# Patient Record
Sex: MALE | Race: WHITE
[De-identification: names, ages, dates, MRNs, and addresses within clinical notes are randomized per-mention and may not be internally consistent; named-entity substitution may affect disease eponyms.]

---

## 2019-04-07 ENCOUNTER — HOSPITAL ENCOUNTER (EMERGENCY)
Dept: HOSPITAL 56 - MW.ED | Age: 29
Discharge: HOME | End: 2019-04-07
Payer: COMMERCIAL

## 2019-04-07 DIAGNOSIS — S01.81XA: Primary | ICD-10-CM
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
## 2019-04-07 PROCEDURE — 12013 RPR F/E/E/N/L/M 2.6-5.0 CM: CPT

## 2019-04-07 PROCEDURE — 99283 EMERGENCY DEPT VISIT LOW MDM: CPT

## 2019-04-07 NOTE — EDM.PDOC
ED HPI GENERAL MEDICAL PROBLEM





- General


Chief Complaint: Laceration


Stated Complaint: HEAD INJURY


Time Seen by Provider: 04/07/19 16:10


Source of Information: Reports: Patient


History Limitations: Reports: No Limitations





- History of Present Illness


INITIAL COMMENTS - FREE TEXT/NARRATIVE: 


HISTORY AND PHYSICAL:





History of present illness:


Patient is a 28-year-old male who presents to the ED today with concern of a 

forehead laceration that occurred just prior to arrival to the ED. Patient was 

swimming when another child had done a back flip and hit him in the forehead 

with his goggles. Patient did not lose consciousness. Patient states he is up-to

-date with his tetanus vaccine.





Patient denies fever, chills. Denies headache, neck stiff ness, change in vision

, syncope, or near syncope. Denies nausea, vomiting. Patient denies any health 

history.


Review of systems: 


As per history of present illness and below otherwise all systems reviewed and 

negative.





Past medical history: 


As per history of present illness and as reviewed below otherwise 

noncontributory.





Surgical history: 


As per history of present illness and as reviewed below otherwise 

noncontributory.





Social history: 


See social history for further information





Family history: 


As per history of present illness and as reviewed below otherwise 

noncontributory.





Physical exam:


General: Patient is alert, oriented, and in no acute distress. He is sitting 

comfortably on exam table.


HEENT: Atraumatic, normocephalic, pupils equal and reactive bilaterally, 

negative for conjunctival pallor or scleral icterus, mucous membranes moist, 

TMs normal bilaterally, throat clear, neck supple, nontender, trachea midline. 

No drooling or trismus noted. No meningeal signs. No hot potato voice noted. 

There is a 3 cm superficial triangular laceration of the left forehead with 

minimal bleeding. 


Lungs: Clear to auscultation, breath sounds equal bilaterally, chest nontender.


Heart: S1S2, regular rate and rhythm without overt murmur


Abdomen: Soft, nondistended, nontender. Negative for masses or 

hepatosplenomegaly. Negative for costovertebral tenderness.


Pelvis: Stable nontender.


Genitourinary: Deferred.


Rectal: Deferred.


Skin: Intact, warm, dry. No lesions or rashes noted.


Extremities: Atraumatic, negative for cords or calf pain. Neurovascular 

unremarkable.


Neuro: Awake, alert, oriented. Cranial nerves II through XII unremarkable. 

Cerebellum unremarkable. Motor and sensory unremarkable throughout. Exam 

nonfocal.





Notes:


Supportive care measures were reviewed and discussed. Voices understanding and 

is agreeable to plan of care. Denies any further questions or concerns at this 

time.





Diagnostics:


None





Therapeutics:


Sutures





Prescription:


None





Impression: 


Forehead laceration





Plan:


1. Keep the area clean and dry. Continue to monitor for signs of infection as 

discussed. Sutures are dissolvable but if they do not dissolve, they are to be 

removed in 7-10 days.


2. Tylenol and/or ibuprofen as directed and as needed for pain management and 

discomfort.


3. Please follow-up with your primary care provider as discussed. Return to the 

ED as needed and as discussed.





Definitive disposition and diagnosis as appropriate pending reevaluation and 

review of above.








- Related Data


 Allergies











Allergy/AdvReac Type Severity Reaction Status Date / Time


 


No Known Allergies Allergy   Verified 04/07/19 15:07











Home Meds: 


 Home Meds





. [No Known Home Meds]  04/07/19 [History]











Past Medical History


HEENT History: Reports: None


Cardiovascular History: Reports: None


Respiratory History: Reports: None


Gastrointestinal History: Reports: None


Genitourinary History: Reports: None


Musculoskeletal History: Reports: None


Neurological History: Reports: None


Psychiatric History: Reports: None


Endocrine/Metabolic History: Reports: None


Hematologic History: Reports: None


Immunologic History: Reports: None


Oncologic (Cancer) History: Reports: None


Dermatologic History: Reports: None





- Infectious Disease History


Infectious Disease History: Reports: None





- Past Surgical History


Head Surgeries/Procedures: Reports: None


Male  Surgical History: Reports: None





Social & Family History





- Tobacco Use


Years of Tobacco use: 4


Packs/Tins Daily: 0.2





- Caffeine Use


Caffeine Use: Reports: Energy Drinks





- Recreational Drug Use


Recreational Drug Use: No





ED ROS GENERAL





- Review of Systems


Review Of Systems: ROS reveals no pertinent complaints other than HPI.





ED EXAM, SKIN/RASH


Exam: See Below (See dictation)





ED SKIN PROCEDURES





- Laceration/Wound Repair


  ** Left Side Forehead


Lac/Wound length In cm: 3


Appearance: Superficial, Irregular, Clean


Distal NVT: Neuro & Vascular Intact, No Tendon Injury


Anesthetic Type: Local


Local Anesthesia - Lidocaine (Xylocaine): 1% Plain


Local Anesthetic Volume: 5cc


Skin Prep: Chlorhexidine (Hibiciens), Providone-Iodine (Betadine)


Saline Irrigation (cc's): 20


Exploration/Debridement/Repair: Wound Explored, Explored to Base, No Foreign 

Material Found


Closed with: Sutures


Suture Size: other (5-0)


# of Sutures: 5


Suture Type: Interrupted, Other (chromic gut)


Drain Placement: No


Sterile Dressing Applied: Nurse


Tetanus Status Addressed: Yes (patient up to date)


Complications: No





Course





- Vital Signs


Last Recorded V/S: 


 Last Vital Signs











Temp  36.2 C   04/07/19 15:04


 


Pulse  70   04/07/19 15:04


 


Resp  18   04/07/19 15:04


 


BP  122/70   04/07/19 15:04


 


Pulse Ox  98   04/07/19 15:04














- Orders/Labs/Meds


Meds: 


Medications














Discontinued Medications














Generic Name Dose Route Start Last Admin





  Trade Name Ira  PRN Reason Stop Dose Admin


 


Lidocaine HCl  5 ml  04/07/19 16:36  04/07/19 17:01





  Xylocaine-Mpf 1%  INJECT  04/07/19 16:37  5 ml





  ONETIME ONE   Administration





     





     





     





     














Departure





- Departure


Time of Disposition: 17:04


Disposition: Home, Self-Care 01


Clinical Impression: 


Forehead laceration


Qualifiers:


 Encounter type: initial encounter Qualified Code(s): S01.81XA - Laceration 

without foreign body of other part of head, initial encounter








- Discharge Information


Instructions:  Laceration Care, Adult, Easy-to-Read


Referrals: 


PCP,Unknown [Primary Care Provider] - 


Forms:  ED Department Discharge


Additional Instructions: 


The following information is given to patients seen in the emergency department 

who are being discharged to home. This information is to outline your options 

for follow-up care. We provide all patients seen in our emergency department 

with a follow-up referral.





The need for follow-up, as well as the timing and circumstances, are variable 

depending upon the specifics of your emergency department visit.





If you don't have a primary care physician on staff, we will provide you with a 

referral. We always advise you to contact your personal physician following an 

emergency department visit to inform them of the circumstance of the visit and 

for follow-up with them and/or the need for any referrals to a consulting 

specialist.





The emergency department will also refer you to a specialist when appropriate. 

This referral assures that you have the opportunity for follow-up care with a 

specialist. All of these measure are taken in an effort to provide you with 

optimal care, which includes your follow-up.





Under all circumstances we always encourage you to contact your private 

physician who remains a resource for coordinating your care. When calling for 

follow-up care, please make the office aware that this follow-up is from your 

recent emergency room visit. If for any reason you are refused follow-up, 

please contact the [facility] Emergency 

Department at [phone] and asked to speak to the emergency department 

charge nurse.





[facility]


Primary Care


[address] [place] [id number]


Phone: [phone]


Fax: [phone]





[address] [address]


Phone: [phone]


Fax: [phone]








1. Keep the area clean and dry. Continue to monitor for signs of infection as 

discussed. Sutures are dissolvable but if they do not dissolve, they are to be 

removed in 7-10 days.


2. Tylenol and/or ibuprofen as directed and as needed for pain management and 

discomfort.


3. Please follow-up with your primary care provider as discussed. Return to the 

ED as needed and as discussed.